# Patient Record
Sex: MALE | Race: BLACK OR AFRICAN AMERICAN | Employment: FULL TIME | URBAN - METROPOLITAN AREA
[De-identification: names, ages, dates, MRNs, and addresses within clinical notes are randomized per-mention and may not be internally consistent; named-entity substitution may affect disease eponyms.]

---

## 2019-04-17 ENCOUNTER — TELEPHONE (OUTPATIENT)
Dept: FAMILY MEDICINE CLINIC | Facility: CLINIC | Age: 29
End: 2019-04-17

## 2019-04-24 ENCOUNTER — TELEPHONE (OUTPATIENT)
Dept: FAMILY MEDICINE CLINIC | Facility: CLINIC | Age: 29
End: 2019-04-24

## 2019-04-24 ENCOUNTER — PATIENT OUTREACH (OUTPATIENT)
Dept: FAMILY MEDICINE CLINIC | Facility: CLINIC | Age: 29
End: 2019-04-24

## 2024-04-03 ENCOUNTER — OFFICE VISIT (OUTPATIENT)
Dept: FAMILY MEDICINE CLINIC | Facility: CLINIC | Age: 34
End: 2024-04-03
Payer: COMMERCIAL

## 2024-04-03 VITALS
RESPIRATION RATE: 16 BRPM | DIASTOLIC BLOOD PRESSURE: 80 MMHG | HEART RATE: 76 BPM | HEIGHT: 73 IN | SYSTOLIC BLOOD PRESSURE: 110 MMHG | OXYGEN SATURATION: 98 % | BODY MASS INDEX: 25.58 KG/M2 | WEIGHT: 193 LBS

## 2024-04-03 DIAGNOSIS — K92.1 BLOOD IN STOOL: ICD-10-CM

## 2024-04-03 DIAGNOSIS — R30.9 PAIN WITH URINATION: ICD-10-CM

## 2024-04-03 DIAGNOSIS — R10.32 LEFT INGUINAL PAIN: ICD-10-CM

## 2024-04-03 DIAGNOSIS — R10.32 LLQ PAIN: Primary | ICD-10-CM

## 2024-04-03 LAB
APPEARANCE: CLEAR
BILIRUBIN: NEGATIVE
GLUCOSE (URINE DIPSTICK): NEGATIVE MG/DL
KETONES (URINE DIPSTICK): NEGATIVE MG/DL
LEUKOCYTES: NEGATIVE
MULTISTIX LOT#: NORMAL NUMERIC
NITRITE, URINE: NEGATIVE
OCCULT BLOOD: NEGATIVE
PH, URINE: 5.5 (ref 4.5–8)
PROTEIN (URINE DIPSTICK): NEGATIVE MG/DL
SPECIFIC GRAVITY: 1.03 (ref 1–1.03)
URINE-COLOR: YELLOW
UROBILINOGEN,SEMI-QN: 0.2 MG/DL (ref 0–1.9)

## 2024-04-03 PROCEDURE — 81003 URINALYSIS AUTO W/O SCOPE: CPT | Performed by: NURSE PRACTITIONER

## 2024-04-03 PROCEDURE — 99204 OFFICE O/P NEW MOD 45 MIN: CPT | Performed by: NURSE PRACTITIONER

## 2024-04-03 NOTE — PROGRESS NOTES
Inder Cruz is a 33 year old male who presents for abdominal pain.      HPI:  The patient complaints of abdominal pain.  Pain is located at LLQ. Radiating to the groin. Reports started at his back Pain is described as dull pulling pain.  Left flank , radiating groin Severity is moderate. Associated symptoms: no bloating. No radiation.  Has had for 2  weeks. Reports seen at [facility] work up is negative , started on antibiotics .    Denies: jaundice, abnormal discoloration of the stool or skin, no anorexia,  vomiting. No pruritis, no abdominal distention.No yellow discoloration of the sclera.  No travel, no sick contacts, no suspicious food.      Wt Readings from Last 6 Encounters:   04/03/24 193 lb (87.5 kg)   11/08/13 194 lb (88 kg)   04/04/13 215 lb (97.5 kg)   12/19/12 210 lb (95.3 kg)   12/04/12 210 lb 12.8 oz (95.6 kg)     Body mass index is 25.46 kg/m².     No results found for: \"CHOLEST\", \"HDL\", \"LDL\", \"TRIGLY\", \"AST\", \"ALT\"   No current outpatient medications on file.      History reviewed. No pertinent past medical history.   History reviewed. No pertinent surgical history.   History reviewed. No pertinent family history.   Social History:  Social History     Socioeconomic History    Marital status: Single   Tobacco Use    Smoking status: Former   Substance and Sexual Activity    Alcohol use: Yes     Comment: occasionally    Drug use: No         REVIEW OF SYSTEMS:   GENERAL: no lethargy, no fever, no chills  SKIN: denies any unusual skin lesions, rash.  HEENT:no abnormal taste  LUNGS: denies shortness of breath with exertion  CARDIOVASCULAR: denies chest pain on exertion  GI: as above.No heartburn.No melena, no rectal bleeding.No vomiting.  MUSCULOSKELETAL: no myalgia  NEURO: denies headaches      EXAM:   /80   Pulse 76   Resp 16   Ht 6' 1\" (1.854 m)   Wt 193 lb (87.5 kg)   SpO2 98%   BMI 25.46 kg/m²   Body mass index is 25.46 kg/m².   GENERAL: well developed, in no apparent  distress  SKIN: no rashes,no suspicious lesions  HEENT:Moist oral mucosa.  EYES:PERRL, EOMI, sclera not icteric.  NECK: supple,no adenopathy  LUNGS: clear to auscultation  CARDIO: RRR without murmur  GI: good BS's,no masses, HSM, mild tenderness, Calero negative, no guarding, no Psoas sign. No hernias.  EXTREMITIES: no edema  NEURO: DTR 2+bilaterally upper and lower extremities.    ASSESSMENT AND PLAN:    Diagnoses and all orders for this visit:    LLQ pain  -     US GROIN LEFT LIMITED SH(CPT=76882); Future    Pain with urination  -     Urine Dip, auto without Micro    Left inguinal pain  -     US GROIN LEFT LIMITED SH(CPT=76882); Future    Blood in stool  -     Gastro Referral - In Network  -     Occult Blood, Fecal, Immunoassay (Blue cards) [E]; Future  -     Amylase; Future  -     Lipase; Future  -     CBC With Differential With Platelet; Future  -     Comp Metabolic Panel (14) [E]; Future  Stable   Will request old records   F/u for worsening symptoms

## 2024-04-05 ENCOUNTER — HOSPITAL ENCOUNTER (OUTPATIENT)
Dept: ULTRASOUND IMAGING | Age: 34
Discharge: HOME | End: 2024-04-05
Attending: NURSE PRACTITIONER
Payer: COMMERCIAL

## 2024-04-05 DIAGNOSIS — R10.32 LEFT INGUINAL PAIN: ICD-10-CM

## 2024-04-05 DIAGNOSIS — R10.32 LLQ PAIN: ICD-10-CM

## 2024-04-05 PROCEDURE — 76882 US LMTD JT/FCL EVL NVASC XTR: CPT | Performed by: NURSE PRACTITIONER
